# Patient Record
Sex: FEMALE | Race: WHITE | URBAN - METROPOLITAN AREA
[De-identification: names, ages, dates, MRNs, and addresses within clinical notes are randomized per-mention and may not be internally consistent; named-entity substitution may affect disease eponyms.]

---

## 2017-11-07 ENCOUNTER — EMERGENCY (EMERGENCY)
Facility: HOSPITAL | Age: 32
LOS: 1 days | Discharge: HOME | End: 2017-11-07
Attending: EMERGENCY MEDICINE | Admitting: EMERGENCY MEDICINE
Payer: COMMERCIAL

## 2017-11-07 VITALS
HEART RATE: 65 BPM | SYSTOLIC BLOOD PRESSURE: 117 MMHG | DIASTOLIC BLOOD PRESSURE: 81 MMHG | RESPIRATION RATE: 16 BRPM | OXYGEN SATURATION: 100 % | TEMPERATURE: 98 F

## 2017-11-07 VITALS
SYSTOLIC BLOOD PRESSURE: 120 MMHG | TEMPERATURE: 99 F | WEIGHT: 149.91 LBS | DIASTOLIC BLOOD PRESSURE: 78 MMHG | HEART RATE: 80 BPM | RESPIRATION RATE: 16 BRPM | OXYGEN SATURATION: 98 % | HEIGHT: 63 IN

## 2017-11-07 PROCEDURE — 72110 X-RAY EXAM L-2 SPINE 4/>VWS: CPT | Mod: 26

## 2017-11-07 PROCEDURE — 99283 EMERGENCY DEPT VISIT LOW MDM: CPT | Mod: 25

## 2017-11-07 PROCEDURE — 99284 EMERGENCY DEPT VISIT MOD MDM: CPT

## 2017-11-07 PROCEDURE — 72110 X-RAY EXAM L-2 SPINE 4/>VWS: CPT
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
## 2018-03-08 ENCOUNTER — EMERGENCY (EMERGENCY)
Facility: HOSPITAL | Age: 33
LOS: 1 days | Discharge: HOME | End: 2018-03-08
Attending: STUDENT IN AN ORGANIZED HEALTH CARE EDUCATION/TRAINING PROGRAM | Admitting: EMERGENCY MEDICINE
Payer: COMMERCIAL

## 2018-03-08 VITALS
OXYGEN SATURATION: 98 % | HEIGHT: 63 IN | DIASTOLIC BLOOD PRESSURE: 77 MMHG | RESPIRATION RATE: 16 BRPM | TEMPERATURE: 98 F | WEIGHT: 145.95 LBS | SYSTOLIC BLOOD PRESSURE: 116 MMHG | HEART RATE: 78 BPM

## 2018-03-08 PROCEDURE — 99283 EMERGENCY DEPT VISIT LOW MDM: CPT

## 2018-03-08 PROCEDURE — 99284 EMERGENCY DEPT VISIT MOD MDM: CPT

## 2018-03-08 RX ORDER — ALBUTEROL 90 UG/1
2 AEROSOL, METERED ORAL
Qty: 0 | Refills: 0 | COMMUNITY

## 2018-03-08 RX ORDER — FLUTICASONE PROPIONATE 220 MCG
2 AEROSOL WITH ADAPTER (GRAM) INHALATION
Qty: 0 | Refills: 0 | COMMUNITY

## 2018-03-08 RX ORDER — DIPHENHYDRAMINE HCL 50 MG
25 CAPSULE ORAL ONCE
Qty: 0 | Refills: 0 | Status: COMPLETED | OUTPATIENT
Start: 2018-03-08 | End: 2018-03-08

## 2018-03-08 RX ORDER — MONTELUKAST 4 MG/1
1 TABLET, CHEWABLE ORAL
Qty: 0 | Refills: 0 | COMMUNITY

## 2018-03-08 RX ORDER — FAMOTIDINE 10 MG/ML
20 INJECTION INTRAVENOUS ONCE
Qty: 0 | Refills: 0 | Status: COMPLETED | OUTPATIENT
Start: 2018-03-08 | End: 2018-03-08

## 2018-03-08 RX ADMIN — FAMOTIDINE 20 MILLIGRAM(S): 10 INJECTION INTRAVENOUS at 23:48

## 2018-03-08 RX ADMIN — Medication 20 MILLIGRAM(S): at 23:48

## 2018-03-08 RX ADMIN — Medication 25 MILLIGRAM(S): at 23:48

## 2018-03-08 NOTE — ED ADULT NURSE NOTE - OBJECTIVE STATEMENT
32 yr. old female c/o rash starting 4 am today.  Hive on left ankle and rash has spread to bilateral ankles with to upper legs. No SOB, or respiratory distress.

## 2018-03-08 NOTE — ED ADULT TRIAGE NOTE - CHIEF COMPLAINT QUOTE
" I woke this morning with rash on my left ankle. The rash started spreading after I took a shower tonight "

## 2018-03-08 NOTE — ED ADULT NURSE REASSESSMENT NOTE - NS ED NURSE REASSESS COMMENT FT1
Bilat hives to lower legs. pt states allergic to her cat. Gets hives when touches cat. states hung pants up and cat may have rubbed against the pants.

## 2018-03-09 VITALS
TEMPERATURE: 98 F | HEART RATE: 63 BPM | RESPIRATION RATE: 16 BRPM | OXYGEN SATURATION: 99 % | SYSTOLIC BLOOD PRESSURE: 113 MMHG | DIASTOLIC BLOOD PRESSURE: 75 MMHG

## 2018-03-09 NOTE — ED PROVIDER NOTE - SKIN, MLM
Skin normal color for race, warm, dry and intact. B/L ankles with redness at medial malleolus. No discrete raised papules.  Non-tender, blanching, no abscess, no LE edema

## 2018-03-09 NOTE — ED PROVIDER NOTE - OBJECTIVE STATEMENT
32 year old female with a history of asthma presents with hives to b/l ankles.  Patient states she woke up yesterday morning with left ankle itching. She thought she had an insect bite. Applied hydrocortisone ointment and was well throughout the day.  Last night, she noted right ankle and lower leg hives as well and felt that the hives on her left ankle were spreading. No respiratory distress or fevers.  No known medication or food allergies. No new detergents or contact irritants. States she gets hives when petting her cat and suspects cat hair in her jeans around her ankles.  She did not take any other medication for this and currently feels that the hives are improving. PMD Dr. Arelis Williamson

## 2018-09-03 ENCOUNTER — TRANSCRIPTION ENCOUNTER (OUTPATIENT)
Age: 33
End: 2018-09-03

## 2019-05-10 ENCOUNTER — TRANSCRIPTION ENCOUNTER (OUTPATIENT)
Age: 34
End: 2019-05-10

## 2021-06-04 NOTE — ED ADULT TRIAGE NOTE - HEART RATE (BEATS/MIN)
Post-op OV : Patient denies -> rectal bleeding, fever, nausea & vomiting since the procedure date;   Post-op OV : Patient -> denies any new changes in his/her health status since last OV.  Current BM: Has one BM every 2 days;   Post-op OV : After Colonoscopy on -> 05/21/2021, at which time one 4mm polyp was detected and resected. Histology showed it was tubular adenoma. Non- bleeding grade I internal and external hemorrhoids were found during retroflexion but not banded. Excellent bowel prep;   Follow up OV : Patient is here for a routine OV for -> constipation ;   Follow up OV : Last OV was -> 1 month ago;   Follow up OV : Patient is on -> Colace 100mg x 2 tabs QD   Has one BM every 2 days;   
80

## 2022-04-22 ENCOUNTER — EMERGENCY (EMERGENCY)
Facility: HOSPITAL | Age: 37
LOS: 0 days | Discharge: HOME | End: 2022-04-22
Attending: EMERGENCY MEDICINE
Payer: COMMERCIAL

## 2022-04-22 VITALS
SYSTOLIC BLOOD PRESSURE: 103 MMHG | TEMPERATURE: 98 F | RESPIRATION RATE: 18 BRPM | OXYGEN SATURATION: 96 % | HEART RATE: 90 BPM | DIASTOLIC BLOOD PRESSURE: 64 MMHG

## 2022-04-22 VITALS
HEART RATE: 86 BPM | OXYGEN SATURATION: 100 % | TEMPERATURE: 97 F | RESPIRATION RATE: 18 BRPM | SYSTOLIC BLOOD PRESSURE: 142 MMHG | WEIGHT: 169.98 LBS | DIASTOLIC BLOOD PRESSURE: 84 MMHG | HEIGHT: 63 IN

## 2022-04-22 DIAGNOSIS — N93.9 ABNORMAL UTERINE AND VAGINAL BLEEDING, UNSPECIFIED: ICD-10-CM

## 2022-04-22 DIAGNOSIS — K64.4 RESIDUAL HEMORRHOIDAL SKIN TAGS: ICD-10-CM

## 2022-04-22 DIAGNOSIS — Z87.59 PERSONAL HISTORY OF OTHER COMPLICATIONS OF PREGNANCY, CHILDBIRTH AND THE PUERPERIUM: ICD-10-CM

## 2022-04-22 DIAGNOSIS — J45.909 UNSPECIFIED ASTHMA, UNCOMPLICATED: ICD-10-CM

## 2022-04-22 DIAGNOSIS — R10.2 PELVIC AND PERINEAL PAIN: ICD-10-CM

## 2022-04-22 LAB
ALBUMIN SERPL ELPH-MCNC: 3.6 G/DL — SIGNIFICANT CHANGE UP (ref 3.3–5)
ALP SERPL-CCNC: 83 U/L — SIGNIFICANT CHANGE UP (ref 40–120)
ALT FLD-CCNC: 28 U/L — SIGNIFICANT CHANGE UP (ref 12–78)
ANION GAP SERPL CALC-SCNC: 3 MMOL/L — LOW (ref 5–17)
APPEARANCE UR: CLEAR — SIGNIFICANT CHANGE UP
AST SERPL-CCNC: 17 U/L — SIGNIFICANT CHANGE UP (ref 15–37)
BASOPHILS # BLD AUTO: 0.03 K/UL — SIGNIFICANT CHANGE UP (ref 0–0.2)
BASOPHILS NFR BLD AUTO: 0.5 % — SIGNIFICANT CHANGE UP (ref 0–2)
BILIRUB SERPL-MCNC: <0.1 MG/DL — LOW (ref 0.2–1.2)
BILIRUB UR-MCNC: NEGATIVE — SIGNIFICANT CHANGE UP
BUN SERPL-MCNC: 10 MG/DL — SIGNIFICANT CHANGE UP (ref 7–23)
CALCIUM SERPL-MCNC: 9.2 MG/DL — SIGNIFICANT CHANGE UP (ref 8.5–10.1)
CHLORIDE SERPL-SCNC: 113 MMOL/L — HIGH (ref 96–108)
CO2 SERPL-SCNC: 28 MMOL/L — SIGNIFICANT CHANGE UP (ref 22–31)
COLOR SPEC: YELLOW — SIGNIFICANT CHANGE UP
CREAT SERPL-MCNC: 0.69 MG/DL — SIGNIFICANT CHANGE UP (ref 0.5–1.3)
DIFF PNL FLD: ABNORMAL
EGFR: 114 ML/MIN/1.73M2 — SIGNIFICANT CHANGE UP
EOSINOPHIL # BLD AUTO: 0.22 K/UL — SIGNIFICANT CHANGE UP (ref 0–0.5)
EOSINOPHIL NFR BLD AUTO: 3.7 % — SIGNIFICANT CHANGE UP (ref 0–6)
GLUCOSE SERPL-MCNC: 116 MG/DL — HIGH (ref 70–99)
GLUCOSE UR QL: NEGATIVE — SIGNIFICANT CHANGE UP
HCG SERPL-ACNC: <1 MIU/ML — SIGNIFICANT CHANGE UP
HCT VFR BLD CALC: 38 % — SIGNIFICANT CHANGE UP (ref 34.5–45)
HGB BLD-MCNC: 11.9 G/DL — SIGNIFICANT CHANGE UP (ref 11.5–15.5)
IMM GRANULOCYTES NFR BLD AUTO: 0.2 % — SIGNIFICANT CHANGE UP (ref 0–1.5)
KETONES UR-MCNC: NEGATIVE — SIGNIFICANT CHANGE UP
LEUKOCYTE ESTERASE UR-ACNC: NEGATIVE — SIGNIFICANT CHANGE UP
LYMPHOCYTES # BLD AUTO: 2.18 K/UL — SIGNIFICANT CHANGE UP (ref 1–3.3)
LYMPHOCYTES # BLD AUTO: 37.1 % — SIGNIFICANT CHANGE UP (ref 13–44)
MCHC RBC-ENTMCNC: 27.7 PG — SIGNIFICANT CHANGE UP (ref 27–34)
MCHC RBC-ENTMCNC: 31.3 GM/DL — LOW (ref 32–36)
MCV RBC AUTO: 88.4 FL — SIGNIFICANT CHANGE UP (ref 80–100)
MONOCYTES # BLD AUTO: 0.52 K/UL — SIGNIFICANT CHANGE UP (ref 0–0.9)
MONOCYTES NFR BLD AUTO: 8.8 % — SIGNIFICANT CHANGE UP (ref 2–14)
NEUTROPHILS # BLD AUTO: 2.92 K/UL — SIGNIFICANT CHANGE UP (ref 1.8–7.4)
NEUTROPHILS NFR BLD AUTO: 49.7 % — SIGNIFICANT CHANGE UP (ref 43–77)
NITRITE UR-MCNC: NEGATIVE — SIGNIFICANT CHANGE UP
PH UR: 6 — SIGNIFICANT CHANGE UP (ref 5–8)
PLATELET # BLD AUTO: 345 K/UL — SIGNIFICANT CHANGE UP (ref 150–400)
POTASSIUM SERPL-MCNC: 4.6 MMOL/L — SIGNIFICANT CHANGE UP (ref 3.5–5.3)
POTASSIUM SERPL-SCNC: 4.6 MMOL/L — SIGNIFICANT CHANGE UP (ref 3.5–5.3)
PROT SERPL-MCNC: 7 GM/DL — SIGNIFICANT CHANGE UP (ref 6–8.3)
PROT UR-MCNC: NEGATIVE — SIGNIFICANT CHANGE UP
RBC # BLD: 4.3 M/UL — SIGNIFICANT CHANGE UP (ref 3.8–5.2)
RBC # FLD: 15.1 % — HIGH (ref 10.3–14.5)
SODIUM SERPL-SCNC: 144 MMOL/L — SIGNIFICANT CHANGE UP (ref 135–145)
SP GR SPEC: 1 — LOW (ref 1.01–1.02)
UROBILINOGEN FLD QL: NEGATIVE — SIGNIFICANT CHANGE UP
WBC # BLD: 5.88 K/UL — SIGNIFICANT CHANGE UP (ref 3.8–10.5)
WBC # FLD AUTO: 5.88 K/UL — SIGNIFICANT CHANGE UP (ref 3.8–10.5)

## 2022-04-22 PROCEDURE — 36415 COLL VENOUS BLD VENIPUNCTURE: CPT

## 2022-04-22 PROCEDURE — 86901 BLOOD TYPING SEROLOGIC RH(D): CPT

## 2022-04-22 PROCEDURE — 86900 BLOOD TYPING SEROLOGIC ABO: CPT

## 2022-04-22 PROCEDURE — 80053 COMPREHEN METABOLIC PANEL: CPT

## 2022-04-22 PROCEDURE — 99285 EMERGENCY DEPT VISIT HI MDM: CPT

## 2022-04-22 PROCEDURE — 85025 COMPLETE CBC W/AUTO DIFF WBC: CPT

## 2022-04-22 PROCEDURE — 76830 TRANSVAGINAL US NON-OB: CPT | Mod: 26

## 2022-04-22 PROCEDURE — 84702 CHORIONIC GONADOTROPIN TEST: CPT

## 2022-04-22 PROCEDURE — 76830 TRANSVAGINAL US NON-OB: CPT

## 2022-04-22 PROCEDURE — 99284 EMERGENCY DEPT VISIT MOD MDM: CPT | Mod: 25

## 2022-04-22 PROCEDURE — 86850 RBC ANTIBODY SCREEN: CPT

## 2022-04-22 PROCEDURE — 81001 URINALYSIS AUTO W/SCOPE: CPT

## 2022-04-22 RX ORDER — SODIUM CHLORIDE 9 MG/ML
1000 INJECTION INTRAMUSCULAR; INTRAVENOUS; SUBCUTANEOUS ONCE
Refills: 0 | Status: COMPLETED | OUTPATIENT
Start: 2022-04-22 | End: 2022-04-22

## 2022-04-22 RX ORDER — HYDROCORTISONE 1 %
1 OINTMENT (GRAM) TOPICAL
Qty: 1 | Refills: 0
Start: 2022-04-22 | End: 2022-04-26

## 2022-04-22 RX ADMIN — SODIUM CHLORIDE 1000 MILLILITER(S): 9 INJECTION INTRAMUSCULAR; INTRAVENOUS; SUBCUTANEOUS at 02:26

## 2022-04-22 NOTE — ED PROVIDER NOTE - OBJECTIVE STATEMENT
Pt. is a 37 yo F with a hx of hemorrhoids, hx of C section 2/3/22 without any complications presents s/p bleeding while in the shower.  Patient notes slight pelvic cramping yesterday and today and then today painless bleeding down leg in the shower.  Patient states it appeared to be coming from the vagina, but she could not tell.  She was worried because she had a light period , which was her first post partum period on 4/7/22.  She states bleeding has stopped now.  She drank 3 beers tonight.  Denies fevers, dizziness, chest pain, trouble breathing, abdominal pain or back pain.  Denies dysuria or hematuria.  +fatigue.

## 2022-04-22 NOTE — ED PROVIDER NOTE - CLINICAL SUMMARY MEDICAL DECISION MAKING FREE TEXT BOX
Bleeding source identified as external hemorrhoid.  Bleeding stopped.  Hemorrhoid cream prescribed.  Patient to f/u with PMD, GI and OB/GYN.

## 2022-04-22 NOTE — ED ADULT NURSE NOTE - NS ED NURSE RECORD ANOTHER HT AND WT
Initiate Regimen: Bryhali , apply to affected areas once daily x3 weeks. Take 1 week off, repeat as needed. Moisturize after application Start Regimen: Bryhali , apply to affected areas once daily x3 weeks. Take 1 week off, repeat as needed. Moisturize after application Yes

## 2022-04-22 NOTE — ED PROVIDER NOTE - PATIENT PORTAL LINK FT
You can access the FollowMyHealth Patient Portal offered by [facility] by registering at the following website: [email or web address]. By joining [employer]’s FollowMyHealth portal, you will also be able to view your health information using other applications (apps) compatible with our system.

## 2022-04-22 NOTE — ED ADULT NURSE NOTE - OBJECTIVE STATEMENT
37 y/o alert female comes into the ED for c/o of vaginal bleed, pt states she is not sure if its vaginal or rectal bleeding but states it is BRB x1day. denies chest pain and SOB

## 2022-04-22 NOTE — ED PROVIDER NOTE - NSFOLLOWUPINSTRUCTIONS_ED_ALL_ED_FT
See your primary doctor, gynecologist and a gastroenterologist within 1-2 weeks.   If bleeding returns or worsens or is uncontrolled return to the ER.                                                                                             Hemorrhoids       Hemorrhoids are swollen veins that may develop:  •In the butt (rectum). These are called internal hemorrhoids.      •Around the opening of the butt (anus). These are called external hemorrhoids.      Hemorrhoids can cause pain, itching, or bleeding. Most of the time, they do not cause serious problems. They usually get better with diet changes, lifestyle changes, and other home treatments.      What are the causes?    This condition may be caused by:  •Having trouble pooping (constipation).      •Pushing hard (straining) to poop.      •Watery poop (diarrhea).      •Pregnancy.       •Being very overweight (obese).      •Sitting for long periods of time.       •Heavy lifting or other activity that causes you to strain.       •Anal sex.       •Riding a bike for a long period of time.        What are the signs or symptoms?    Symptoms of this condition include:  •Pain.       •Itching or soreness in the butt.      •Bleeding from the butt.      •Leaking poop.      •Swelling in the area.      •One or more lumps around the opening of your butt.        How is this diagnosed?    A doctor can often diagnose this condition by looking at the affected area. The doctor may also:  •Do an exam that involves feeling the area with a gloved hand (digital rectal exam).      •Examine the area inside your butt using a small tube (anoscope).      •Order blood tests. This may be done if you have lost a lot of blood.      •Have you get a test that involves looking inside the colon using a flexible tube with a camera on the end (sigmoidoscopy or colonoscopy).        How is this treated?    This condition can usually be treated at home. Your doctor may tell you to change what you eat, make lifestyle changes, or try home treatments. If these do not help, procedures can be done to remove the hemorrhoids or make them smaller. These may involve:  •Placing rubber bands at the base of the hemorrhoids to cut off their blood supply.      •Injecting medicine into the hemorrhoids to shrink them.      •Shining a type of light energy onto the hemorrhoids to cause them to fall off.      •Doing surgery to remove the hemorrhoids or cut off their blood supply.        Follow these instructions at home:      Eating and drinking      •Eat foods that have a lot of fiber in them. These include whole grains, beans, nuts, fruits, and vegetables.      •Ask your doctor about taking products that have added fiber (fibersupplements).    •Reduce the amount of fat in your diet. You can do this by:  •Eating low-fat dairy products.      •Eating less red meat.      •Avoiding processed foods.        •Drink enough fluid to keep your pee (urine) pale yellow.        Managing pain and swelling      •Take a warm-water bath (sitz bath) for 20 minutes to ease pain. Do this 3–4 times a day. You may do this in a bathtub or using a portable sitz bath that fits over the toilet.    •If told, put ice on the painful area. It may be helpful to use ice between your warm baths.  •Put ice in a plastic bag.      •Place a towel between your skin and the bag.      •Leave the ice on for 20 minutes, 2–3 times a day.        General instructions   •Take over-the-counter and prescription medicines only as told by your doctor.  •Medicated creams and medicines may be used as told.        •Exercise often. Ask your doctor how much and what kind of exercise is best for you.      •Go to the bathroom when you have the urge to poop. Do not wait.      •Avoid pushing too hard when you poop.      •Keep your butt dry and clean. Use wet toilet paper or moist towelettes after pooping.      • Do not sit on the toilet for a long time.      •Keep all follow-up visits as told by your doctor. This is important.        Contact a doctor if you:    •Have pain and swelling that do not get better with treatment or medicine.      •Have trouble pooping.      •Cannot poop.      •Have pain or swelling outside the area of the hemorrhoids.        Get help right away if you have:    •Bleeding that will not stop.        Summary    •Hemorrhoids are swollen veins in the butt or around the opening of the butt.      •They can cause pain, itching, or bleeding.      •Eat foods that have a lot of fiber in them. These include whole grains, beans, nuts, fruits, and vegetables.      •Take a warm-water bath (sitz bath) for 20 minutes to ease pain. Do this 3–4 times a day.      This information is not intended to replace advice given to you by your health care provider. Make sure you discuss any questions you have with your health care provider.      Document Revised: 12/26/2019 Document Reviewed: 05/09/2019    Elsevier Patient Education © 2022 [employer]

## 2022-04-22 NOTE — ED ADULT TRIAGE NOTE - CHIEF COMPLAINT QUOTE
cc of "possible post partum hemorrhage", BRBPV x30 minutes. pt was in the shower when she noticed vaginal bleeding. pt delivered on February 3rd, cesarian, no complications. had first PP period on 4/7. denies dizziness, lightheadedness. states she had cramps yesterday but denies cramping/pain at this time. endorses fatigue. also reports she consumed 3 beers this evening.

## 2022-04-22 NOTE — ED PROVIDER NOTE - PROGRESS NOTE DETAILS
NO gross blood on vaginal wand for US and US was normal.  Patient now suspicious blood on pad which has slowed and stopped was coming from a hemorrhoid.  Rectal exam performed and small non=thrombosed external hemorrhoid visualized with adjacent small anal fissure. Will prescribe hemorrhoid cream and refer to GI.

## 2022-04-25 PROBLEM — J45.909 UNSPECIFIED ASTHMA, UNCOMPLICATED: Chronic | Status: ACTIVE | Noted: 2017-11-07
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
## 2024-08-04 ENCOUNTER — INPATIENT (INPATIENT)
Facility: HOSPITAL | Age: 39
LOS: 0 days | Discharge: HOME | DRG: 310 | End: 2024-08-05
Attending: HOSPITALIST | Admitting: INTERNAL MEDICINE
Payer: COMMERCIAL

## 2024-08-04 VITALS
HEIGHT: 63 IN | SYSTOLIC BLOOD PRESSURE: 138 MMHG | RESPIRATION RATE: 19 BRPM | TEMPERATURE: 98 F | DIASTOLIC BLOOD PRESSURE: 65 MMHG | WEIGHT: 160.06 LBS | OXYGEN SATURATION: 100 % | HEART RATE: 109 BPM

## 2024-08-04 PROCEDURE — 71046 X-RAY EXAM CHEST 2 VIEWS: CPT | Mod: 26

## 2024-08-04 PROCEDURE — 99285 EMERGENCY DEPT VISIT HI MDM: CPT

## 2024-08-04 PROCEDURE — 93010 ELECTROCARDIOGRAM REPORT: CPT

## 2024-08-04 RX ORDER — ACETAMINOPHEN 500 MG
650 TABLET ORAL ONCE
Refills: 0 | Status: COMPLETED | OUTPATIENT
Start: 2024-08-04 | End: 2024-08-04

## 2024-08-04 RX ADMIN — Medication 650 MILLIGRAM(S): at 21:57

## 2024-08-04 NOTE — ED ADULT TRIAGE NOTE - CHIEF COMPLAINT QUOTE
pt bibems from home s/p possible allergic reaction. pt reports having 5 cherries approx 45mins PTA, started having burning in mouth, which then became numbness and itching in mouth and throat. pt took zyrtec no swelling of throat or tongue, endorses feeling "tightness" in lower lip. pt in no apparent respiratory distress, denies Chest pain or SOB. no known food allergies, allergic to motrin. pmh asthma.

## 2024-08-04 NOTE — ED PROVIDER NOTE - NS_EDPROVIDERDISPOUSERTYPE_ED_A_ED
Difficulty making decisions/Difficulty remembering Attending Attestation (For Attendings USE Only)... Attending Attestation (For Attendings USE Only).../Scribe Attestation (For Scribes USE Only)...

## 2024-08-04 NOTE — ED PROVIDER NOTE - PROGRESS NOTE DETAILS
Orlando Lenz DO (Attending): Alerted by telemetry tech that patient had run of 10 beats of NSVT.  On reassessment patient states she was feeling palpitations at this time.  Has had intermittent palpitations over several days.  Patient's allergic symptoms have resolved.  Will now admit patient for EP evaluation.  Labs and formal EKG ordered.  Will admit patient to telemetry. Judd: Received in sign out pending labs work. Labs wnl except found to have hypophosphatemia. Repletion began in ED. No further episodes of NSVT on tele monitor. Patient admitted to medicine in stable condition.

## 2024-08-04 NOTE — ED PROVIDER NOTE - OBJECTIVE STATEMENT
38-year-old female past medical history of asthma presents the ED planing of sudden onset mouth burning and itchiness after eating cherries.  Patient has a history of oral allergy syndrome but is never had symptoms so severe.  Patient did believe that her lower lip was swollen and took a Zyrtec prior to arrival.  Denies throat swelling, shortness of breath, rash, tongue swelling, nausea, vomiting, abdominal pain, chest pain, dysuria, diarrhea.

## 2024-08-04 NOTE — ED PROVIDER NOTE - CLINICAL SUMMARY MEDICAL DECISION MAKING FREE TEXT BOX
Orlando Lenz DO (Attending): 38-year-old female past medical history of asthma presents the ED planing of sudden onset mouth burning and itchiness after eating cherries.  Patient has a history of oral allergy syndrome but is never had symptoms so severe.  Patient did believe that her lower lip was swollen and took a Zyrtec prior to arrival.  Denies throat swelling, shortness of breath, rash, tongue swelling, nausea, vomiting, abdominal pain, chest pain, dysuria, diarrhea. Patient hemodynamically stable, well-appearing without signs of anaphylaxis.  Patient just complaining of mild headache.  No concern for anaphylaxis and patient's symptoms of basically resolved with over-the-counter antihistamine.  Plan to observe patient for any signs of anaphylaxis and likely discharge.

## 2024-08-04 NOTE — ED ADULT NURSE NOTE - OBJECTIVE STATEMENT
36.8 pt. is a 39 y/o female a&o x3 BIBEMS complaining of allergic reaction s/p eating cherries. PMHx asthma and oral allergy syndrome as diagnosed by PCP. pt. endorsing onset of mouth burning and itchiness immediately after eating cherries.  Patient did believe that her lower lip was swollen and took a Zyrtec prior to arrival. pt. denies any drooling, shortness of breath, throat irritation, fevers, chills, or any discomfort.

## 2024-08-04 NOTE — ED ADULT NURSE REASSESSMENT NOTE - NS ED NURSE REASSESS COMMENT FT1
as per telemetry tech, pt. entered 10 seconds of SVT in bathroom, endorsing palpitations at this time. MD ott made aware, labs and EKG obtained and sent.

## 2024-08-04 NOTE — ED PROVIDER NOTE - PHYSICAL EXAMINATION
*-* INSURANCE *-*



ALL CLINICALS AND REVIEWS HAVE BEEN FAXED TO:



[employer]

TRK# [id number]

F: [phone] GEN: Patient awake alert NAD.   HEENT: normocephalic, atraumatic, Normal oropharynx without tonsillar edema, uvula midline, tongue without edema, no periorbital or perioral edema, EOMI, no scleral icterus, moist MM  CARDIAC: RRR, S1, S2, no murmur.   PULM: CTA B/L no wheeze, rhonchi, rales.   ABD: soft NT, ND, no rebound no guarding,   MSK: Moving all extremities, no edema.   NEURO: A&Ox3, gait normal, no focal neurological deficits,  SKIN: warm, dry, no rash.

## 2024-08-05 ENCOUNTER — RESULT REVIEW (OUTPATIENT)
Age: 39
End: 2024-08-05

## 2024-08-05 ENCOUNTER — TRANSCRIPTION ENCOUNTER (OUTPATIENT)
Age: 39
End: 2024-08-05

## 2024-08-05 VITALS
TEMPERATURE: 98 F | OXYGEN SATURATION: 100 % | RESPIRATION RATE: 19 BRPM | HEART RATE: 71 BPM | DIASTOLIC BLOOD PRESSURE: 62 MMHG | SYSTOLIC BLOOD PRESSURE: 112 MMHG

## 2024-08-05 DIAGNOSIS — I47.29 OTHER VENTRICULAR TACHYCARDIA: ICD-10-CM

## 2024-08-05 LAB
A1C WITH ESTIMATED AVERAGE GLUCOSE RESULT: 5.2 % — SIGNIFICANT CHANGE UP (ref 4–5.6)
ALBUMIN SERPL ELPH-MCNC: 3.1 G/DL — LOW (ref 3.3–5)
ALBUMIN SERPL ELPH-MCNC: 3.4 G/DL — SIGNIFICANT CHANGE UP (ref 3.3–5)
ALP SERPL-CCNC: 63 U/L — SIGNIFICANT CHANGE UP (ref 40–120)
ALP SERPL-CCNC: 72 U/L — SIGNIFICANT CHANGE UP (ref 40–120)
ALT FLD-CCNC: 18 U/L — SIGNIFICANT CHANGE UP (ref 12–78)
ALT FLD-CCNC: 21 U/L — SIGNIFICANT CHANGE UP (ref 12–78)
ANION GAP SERPL CALC-SCNC: 6 MMOL/L — SIGNIFICANT CHANGE UP (ref 5–17)
ANION GAP SERPL CALC-SCNC: 8 MMOL/L — SIGNIFICANT CHANGE UP (ref 5–17)
AST SERPL-CCNC: 14 U/L — LOW (ref 15–37)
AST SERPL-CCNC: 19 U/L — SIGNIFICANT CHANGE UP (ref 15–37)
BASOPHILS # BLD AUTO: 0.03 K/UL — SIGNIFICANT CHANGE UP (ref 0–0.2)
BASOPHILS # BLD AUTO: 0.03 K/UL — SIGNIFICANT CHANGE UP (ref 0–0.2)
BASOPHILS NFR BLD AUTO: 0.4 % — SIGNIFICANT CHANGE UP (ref 0–2)
BASOPHILS NFR BLD AUTO: 0.5 % — SIGNIFICANT CHANGE UP (ref 0–2)
BILIRUB SERPL-MCNC: 0.2 MG/DL — SIGNIFICANT CHANGE UP (ref 0.2–1.2)
BILIRUB SERPL-MCNC: 0.3 MG/DL — SIGNIFICANT CHANGE UP (ref 0.2–1.2)
BUN SERPL-MCNC: 7 MG/DL — SIGNIFICANT CHANGE UP (ref 7–23)
BUN SERPL-MCNC: 9 MG/DL — SIGNIFICANT CHANGE UP (ref 7–23)
CALCIUM SERPL-MCNC: 8.4 MG/DL — LOW (ref 8.5–10.1)
CALCIUM SERPL-MCNC: 8.9 MG/DL — SIGNIFICANT CHANGE UP (ref 8.5–10.1)
CHLORIDE SERPL-SCNC: 109 MMOL/L — HIGH (ref 96–108)
CHLORIDE SERPL-SCNC: 114 MMOL/L — HIGH (ref 96–108)
CHOLEST SERPL-MCNC: 147 MG/DL — SIGNIFICANT CHANGE UP
CO2 SERPL-SCNC: 23 MMOL/L — SIGNIFICANT CHANGE UP (ref 22–31)
CO2 SERPL-SCNC: 24 MMOL/L — SIGNIFICANT CHANGE UP (ref 22–31)
CREAT SERPL-MCNC: 0.6 MG/DL — SIGNIFICANT CHANGE UP (ref 0.5–1.3)
CREAT SERPL-MCNC: 0.86 MG/DL — SIGNIFICANT CHANGE UP (ref 0.5–1.3)
EGFR: 116 ML/MIN/1.73M2 — SIGNIFICANT CHANGE UP
EGFR: 88 ML/MIN/1.73M2 — SIGNIFICANT CHANGE UP
EOSINOPHIL # BLD AUTO: 0.02 K/UL — SIGNIFICANT CHANGE UP (ref 0–0.5)
EOSINOPHIL # BLD AUTO: 0.07 K/UL — SIGNIFICANT CHANGE UP (ref 0–0.5)
EOSINOPHIL NFR BLD AUTO: 0.3 % — SIGNIFICANT CHANGE UP (ref 0–6)
EOSINOPHIL NFR BLD AUTO: 1 % — SIGNIFICANT CHANGE UP (ref 0–6)
ESTIMATED AVERAGE GLUCOSE: 103 MG/DL — SIGNIFICANT CHANGE UP (ref 68–114)
GLUCOSE SERPL-MCNC: 145 MG/DL — HIGH (ref 70–99)
GLUCOSE SERPL-MCNC: 98 MG/DL — SIGNIFICANT CHANGE UP (ref 70–99)
HCG SERPL-ACNC: <1 MIU/ML — SIGNIFICANT CHANGE UP
HCT VFR BLD CALC: 38.5 % — SIGNIFICANT CHANGE UP (ref 34.5–45)
HCT VFR BLD CALC: 39 % — SIGNIFICANT CHANGE UP (ref 34.5–45)
HDLC SERPL-MCNC: 64 MG/DL — SIGNIFICANT CHANGE UP
HGB BLD-MCNC: 12.6 G/DL — SIGNIFICANT CHANGE UP (ref 11.5–15.5)
HGB BLD-MCNC: 12.7 G/DL — SIGNIFICANT CHANGE UP (ref 11.5–15.5)
IMM GRANULOCYTES NFR BLD AUTO: 0.1 % — SIGNIFICANT CHANGE UP (ref 0–0.9)
IMM GRANULOCYTES NFR BLD AUTO: 0.2 % — SIGNIFICANT CHANGE UP (ref 0–0.9)
LIPID PNL WITH DIRECT LDL SERPL: 72 MG/DL — SIGNIFICANT CHANGE UP
LYMPHOCYTES # BLD AUTO: 1.99 K/UL — SIGNIFICANT CHANGE UP (ref 1–3.3)
LYMPHOCYTES # BLD AUTO: 2.13 K/UL — SIGNIFICANT CHANGE UP (ref 1–3.3)
LYMPHOCYTES # BLD AUTO: 29.5 % — SIGNIFICANT CHANGE UP (ref 13–44)
LYMPHOCYTES # BLD AUTO: 34.6 % — SIGNIFICANT CHANGE UP (ref 13–44)
MAGNESIUM SERPL-MCNC: 2.1 MG/DL — SIGNIFICANT CHANGE UP (ref 1.6–2.6)
MCHC RBC-ENTMCNC: 29.9 PG — SIGNIFICANT CHANGE UP (ref 27–34)
MCHC RBC-ENTMCNC: 30.4 PG — SIGNIFICANT CHANGE UP (ref 27–34)
MCHC RBC-ENTMCNC: 32.3 GM/DL — SIGNIFICANT CHANGE UP (ref 32–36)
MCHC RBC-ENTMCNC: 33 GM/DL — SIGNIFICANT CHANGE UP (ref 32–36)
MCV RBC AUTO: 92.1 FL — SIGNIFICANT CHANGE UP (ref 80–100)
MCV RBC AUTO: 92.4 FL — SIGNIFICANT CHANGE UP (ref 80–100)
MONOCYTES # BLD AUTO: 0.5 K/UL — SIGNIFICANT CHANGE UP (ref 0–0.9)
MONOCYTES # BLD AUTO: 0.73 K/UL — SIGNIFICANT CHANGE UP (ref 0–0.9)
MONOCYTES NFR BLD AUTO: 10.1 % — SIGNIFICANT CHANGE UP (ref 2–14)
MONOCYTES NFR BLD AUTO: 8.7 % — SIGNIFICANT CHANGE UP (ref 2–14)
NEUTROPHILS # BLD AUTO: 3.2 K/UL — SIGNIFICANT CHANGE UP (ref 1.8–7.4)
NEUTROPHILS # BLD AUTO: 4.26 K/UL — SIGNIFICANT CHANGE UP (ref 1.8–7.4)
NEUTROPHILS NFR BLD AUTO: 55.7 % — SIGNIFICANT CHANGE UP (ref 43–77)
NEUTROPHILS NFR BLD AUTO: 58.9 % — SIGNIFICANT CHANGE UP (ref 43–77)
NON HDL CHOLESTEROL: 83 MG/DL — SIGNIFICANT CHANGE UP
NT-PROBNP SERPL-SCNC: 19 PG/ML — SIGNIFICANT CHANGE UP (ref 0–125)
PHOSPHATE SERPL-MCNC: 0.9 MG/DL — CRITICAL LOW (ref 2.5–4.5)
PHOSPHATE SERPL-MCNC: 2.7 MG/DL — SIGNIFICANT CHANGE UP (ref 2.5–4.5)
PLATELET # BLD AUTO: 271 K/UL — SIGNIFICANT CHANGE UP (ref 150–400)
PLATELET # BLD AUTO: 291 K/UL — SIGNIFICANT CHANGE UP (ref 150–400)
POTASSIUM SERPL-MCNC: 3.8 MMOL/L — SIGNIFICANT CHANGE UP (ref 3.5–5.3)
POTASSIUM SERPL-MCNC: 4 MMOL/L — SIGNIFICANT CHANGE UP (ref 3.5–5.3)
POTASSIUM SERPL-SCNC: 3.8 MMOL/L — SIGNIFICANT CHANGE UP (ref 3.5–5.3)
POTASSIUM SERPL-SCNC: 4 MMOL/L — SIGNIFICANT CHANGE UP (ref 3.5–5.3)
PROT SERPL-MCNC: 6.2 GM/DL — SIGNIFICANT CHANGE UP (ref 6–8.3)
PROT SERPL-MCNC: 6.9 GM/DL — SIGNIFICANT CHANGE UP (ref 6–8.3)
RBC # BLD: 4.18 M/UL — SIGNIFICANT CHANGE UP (ref 3.8–5.2)
RBC # BLD: 4.22 M/UL — SIGNIFICANT CHANGE UP (ref 3.8–5.2)
RBC # FLD: 13.7 % — SIGNIFICANT CHANGE UP (ref 10.3–14.5)
RBC # FLD: 13.7 % — SIGNIFICANT CHANGE UP (ref 10.3–14.5)
SODIUM SERPL-SCNC: 141 MMOL/L — SIGNIFICANT CHANGE UP (ref 135–145)
SODIUM SERPL-SCNC: 143 MMOL/L — SIGNIFICANT CHANGE UP (ref 135–145)
TRIGL SERPL-MCNC: 51 MG/DL — SIGNIFICANT CHANGE UP
TROPONIN I, HIGH SENSITIVITY RESULT: 3.93 NG/L — SIGNIFICANT CHANGE UP
TSH SERPL-MCNC: 1.51 UU/ML — SIGNIFICANT CHANGE UP (ref 0.34–4.82)
WBC # BLD: 5.75 K/UL — SIGNIFICANT CHANGE UP (ref 3.8–10.5)
WBC # BLD: 7.23 K/UL — SIGNIFICANT CHANGE UP (ref 3.8–10.5)
WBC # FLD AUTO: 5.75 K/UL — SIGNIFICANT CHANGE UP (ref 3.8–10.5)
WBC # FLD AUTO: 7.23 K/UL — SIGNIFICANT CHANGE UP (ref 3.8–10.5)

## 2024-08-05 PROCEDURE — 84100 ASSAY OF PHOSPHORUS: CPT

## 2024-08-05 PROCEDURE — 93005 ELECTROCARDIOGRAM TRACING: CPT

## 2024-08-05 PROCEDURE — 83036 HEMOGLOBIN GLYCOSYLATED A1C: CPT

## 2024-08-05 PROCEDURE — 99222 1ST HOSP IP/OBS MODERATE 55: CPT

## 2024-08-05 PROCEDURE — 80053 COMPREHEN METABOLIC PANEL: CPT

## 2024-08-05 PROCEDURE — 80061 LIPID PANEL: CPT

## 2024-08-05 PROCEDURE — 93306 TTE W/DOPPLER COMPLETE: CPT | Mod: 26

## 2024-08-05 PROCEDURE — 76376 3D RENDER W/INTRP POSTPROCES: CPT | Mod: 26

## 2024-08-05 PROCEDURE — 36415 COLL VENOUS BLD VENIPUNCTURE: CPT

## 2024-08-05 PROCEDURE — 93010 ELECTROCARDIOGRAM REPORT: CPT

## 2024-08-05 PROCEDURE — 85025 COMPLETE CBC W/AUTO DIFF WBC: CPT

## 2024-08-05 PROCEDURE — 99223 1ST HOSP IP/OBS HIGH 75: CPT

## 2024-08-05 PROCEDURE — 84443 ASSAY THYROID STIM HORMONE: CPT

## 2024-08-05 RX ORDER — ALPRAZOLAM 0.5 MG
0.25 TABLET ORAL ONCE
Refills: 0 | Status: DISCONTINUED | OUTPATIENT
Start: 2024-08-05 | End: 2024-08-05

## 2024-08-05 RX ORDER — DIPHENHYDRAMINE HCL 25 MG
25 CAPSULE ORAL
Refills: 0 | Status: DISCONTINUED | OUTPATIENT
Start: 2024-08-05 | End: 2024-08-05

## 2024-08-05 RX ORDER — POTASSIUM CHLORIDE 1500 MG/1
20 TABLET, EXTENDED RELEASE ORAL ONCE
Refills: 0 | Status: DISCONTINUED | OUTPATIENT
Start: 2024-08-05 | End: 2024-08-05

## 2024-08-05 RX ORDER — POTASSIUM PHOSPHATE, MONOBASIC AND POTASSIUM PHOSPHATE, DIBASIC 224; 236 MG/ML; MG/ML
15 INJECTION, SOLUTION INTRAVENOUS ONCE
Refills: 0 | Status: COMPLETED | OUTPATIENT
Start: 2024-08-05 | End: 2024-08-05

## 2024-08-05 RX ORDER — SODIUM PHOSPHATE, MONOBASIC, MONOHYDRATE 276; 142 MG/ML; MG/ML
30 INJECTION, SOLUTION INTRAVENOUS ONCE
Refills: 0 | Status: DISCONTINUED | OUTPATIENT
Start: 2024-08-05 | End: 2024-08-05

## 2024-08-05 RX ORDER — ALBUTEROL 90 MCG
2 AEROSOL REFILL (GRAM) INHALATION EVERY 6 HOURS
Refills: 0 | Status: DISCONTINUED | OUTPATIENT
Start: 2024-08-05 | End: 2024-08-05

## 2024-08-05 RX ADMIN — POTASSIUM PHOSPHATE, MONOBASIC AND POTASSIUM PHOSPHATE, DIBASIC 62.5 MILLIMOLE(S): 224; 236 INJECTION, SOLUTION INTRAVENOUS at 02:35

## 2024-08-05 NOTE — DISCHARGE NOTE PROVIDER - CARE PROVIDER_API CALL
Mina Briceño  Cardiovascular Disease  [address], [facility] [address]  [place] [address]  Phone: [phone]  Fax: [phone]  Follow Up Time: 1 week

## 2024-08-05 NOTE — DISCHARGE NOTE NURSING/CASE MANAGEMENT/SOCIAL WORK - NSDCPEFALRISK_GEN_ALL_CORE
Call patient  She is overdue for her 6 month visit  With I had blood work orders in her chart for April  Please schedule an appointment before she needs another refill  For information on Fall & Injury Prevention, visit: [email or web address] OR  [email or web address] OR  https://www.cdc.gov/steadi/patient.html

## 2024-08-05 NOTE — H&P ADULT - ASSESSMENT
39 y/o F w/ PMH of asthma, oral allergy syndrome, p/w allergic reaction    *NSVT  -Tele monitoring  -Monitor electrolytes  -Echo  -EKG: NSR 96 bpm   -TSH   -Cardio consult     *Hypophosphosphatemia  -Replete w/ IV phos and recheck phos     *Allergic reaction  -Now improved  -Benadryl PRN     *DVT ppx  -SCDs     >75 mins required for admission

## 2024-08-05 NOTE — CONSULT NOTE ADULT - SUBJECTIVE AND OBJECTIVE BOX
Patient is a 38y old  Female who presents with a chief complaint of allergic reaction (05 Aug 2024 03:25)      HPI:  37 y/o F w/ PMH of asthma, oral allergy syndrome, p/w allergic reaction. Patient states that she initially came to ED due mouth burning / itchiness / swelling feeling after eating cherries. Symptoms have symptoms improved. ED physician was going to discharge patient, but then she developed NSVT on Tele, 10 beat run. Patient states that she has been having intermittent palpitations over the last couple of months, where her heart occasionally feels like fluttering. Denies CP, SOB, hoarse voice.   Reports no recent  chest pain or fainting episodes.      PSH:     Social Hx: Denies tobacco, etoh - occasional, drugs - denies     Family Hx: mother - ablation for SVT   (05 Aug 2024 03:25)      PAST MEDICAL & SURGICAL HISTORY:  Asthma      No significant past surgical history            Allergies    Cherries (Angioedema)  Levaquin (Other)    Intolerances        MEDICATIONS  (STANDING):    MEDICATIONS  (PRN):  albuterol    90 MICROgram(s) HFA Inhaler 2 Puff(s) Inhalation every 6 hours PRN Bronchospasm  ALPRAZolam 0.25 milliGRAM(s) Oral Once PRN Anxiety  diphenhydrAMINE 25 milliGRAM(s) Oral two times a day PRN Allergy symptoms      FAMILY HISTORY:  Father, AF  Mother, SVT    SOCIAL HISTORY  Tobacco use:  denies  Alcohol consumption:  denies      REVIEW OF SYSTEMS:  CONSTITUTIONAL: No fever, weight loss, or fatigue  EYES: No eye pain, visual disturbances, or discharge  ENMT:  No difficulty hearing, tinnitus, vertigo; No sinus or throat pain  NECK: No pain or stiffness  RESPIRATORY: No cough, wheezing, chills or hemoptysis; No Shortness of Breath  CARDIOVASCULAR: HPI  GASTROINTESTINAL: No abdominal or epigastric pain. No nausea, vomiting, or hematemesis; No diarrhea or constipation. No melena or hematochezia.  GENITOURINARY: No dysuria, frequency, hematuria, or incontinence  NEUROLOGICAL: No headaches, memory loss, loss of strength, numbness, or tremors  SKIN: No itching, burning, rashes, or lesions   LYMPH Nodes: No enlarged lymph nodes  ENDOCRINE: No heat or cold intolerance; No hair loss  MUSCULOSKELETAL: No joint pain or swelling; No muscle, back, or extremity pain  PSYCHIATRIC: No depression, anxiety, mood swings, or difficulty sleeping  HEME/LYMPH: No easy bruising, or bleeding gums      Vital Signs Last 24 Hrs  T(C): 36.9 (05 Aug 2024 09:09), Max: 36.9 (04 Aug 2024 21:29)  T(F): 98.5 (05 Aug 2024 09:09), Max: 98.5 (05 Aug 2024 09:09)  HR: 71 (05 Aug 2024 09:09) (69 - 109)  BP: 112/62 (05 Aug 2024 09:09) (112/62 - 138/65)  BP(mean): --  RR: 19 (05 Aug 2024 09:09) (18 - 19)  SpO2: 100% (05 Aug 2024 09:09) (99% - 100%)    Parameters below as of 05 Aug 2024 09:09  Patient On (Oxygen Delivery Method): room air        Daily Height in cm: 160.02 (04 Aug 2024 21:29)    Daily Weight in k.8 (05 Aug 2024 03:21)    I&O's Detail      PHYSICAL EXAM:  Appearance: No distress  HEENT:   Normal oral mucosa  Cardiovascular: Normal S1 S2, No JVD, No cardiac murmurs, No carotid bruits, No peripheral edema  Respiratory: Lungs clear to auscultation	  Psychiatry: A & O x 3, Mood & affect appropriate  Gastrointestinal:  Soft, Non-tender, + BS, no bruits	  Skin: No rashes, No ecchymoses, No cyanosis  Neurologic: Grossly non-focal with full strength in all four extremities  Extremities: Normal range of motion, No clubbing, cyanosis or edema  Vascular: Peripheral pulses palpable 2+ bilaterally      INTERPRETATION OF TELEMETRY:    ECG:    LABS:                        12.6   5.75  )-----------( 271      ( 05 Aug 2024 06:33 )             39.0     08-05    143  |  114<H>  |  7   ----------------------------<  98  4.0   |  23  |  0.60    Ca    8.4<L>      05 Aug 2024 06:33  Phos  0.9     08-04  Mg     2.1     08-04    TPro  6.2  /  Alb  3.1<L>  /  TBili  0.3  /  DBili  x   /  AST  14<L>  /  ALT  18  /  AlkPhos  63        Thyroid Stimulating Hormone, Serum: 1.51 uU/mL (24 @ 06:33)    Troponin I, High Sensitivity Result: 3.93 ng/L (24 @ 23:07)  Pro-Brain Natriuretic Peptide: 19 pg/mL (24 @ 23:07)        Admitting attending: Norberto Perez  Outpatient provider:      Patient is a 38y old  Female who presents with a chief complaint of allergic reaction (05 Aug 2024 03:25)      HPI:  37 y/o F w/ PMH of asthma, oral allergy syndrome, p/w allergic reaction. Patient states that she initially came to ED due mouth burning / itchiness / swelling feeling after eating cherries. Symptoms have symptoms improved. ED physician was going to discharge patient, but then she developed NSVT on Tele, 10 beat run. Patient states that she has been having intermittent palpitations over the last couple of months, where her heart occasionally feels like fluttering. Denies CP, SOB, hoarse voice.   Reports no recent  chest pain or fainting episodes.      PSH:     Social Hx: Denies tobacco, etoh - occasional, drugs - denies     Family Hx: mother - ablation for SVT   (05 Aug 2024 03:25)      PAST MEDICAL & SURGICAL HISTORY:  Asthma      No significant past surgical history            Allergies    Cherries (Angioedema)  Levaquin (Other)    Intolerances        MEDICATIONS  (STANDING):    MEDICATIONS  (PRN):  albuterol    90 MICROgram(s) HFA Inhaler 2 Puff(s) Inhalation every 6 hours PRN Bronchospasm  ALPRAZolam 0.25 milliGRAM(s) Oral Once PRN Anxiety  diphenhydrAMINE 25 milliGRAM(s) Oral two times a day PRN Allergy symptoms      FAMILY HISTORY:  Father, AF  Mother, SVT    SOCIAL HISTORY  Tobacco use:  denies  Alcohol consumption:  denies      REVIEW OF SYSTEMS:  CONSTITUTIONAL: No fever, weight loss, or fatigue  EYES: No eye pain, visual disturbances, or discharge  ENMT:  No difficulty hearing, tinnitus, vertigo; No sinus or throat pain  NECK: No pain or stiffness  RESPIRATORY: No cough, wheezing, chills or hemoptysis; No Shortness of Breath  CARDIOVASCULAR: HPI  GASTROINTESTINAL: No abdominal or epigastric pain. No nausea, vomiting, or hematemesis; No diarrhea or constipation. No melena or hematochezia.  GENITOURINARY: No dysuria, frequency, hematuria, or incontinence  NEUROLOGICAL: No headaches, memory loss, loss of strength, numbness, or tremors  SKIN: No itching, burning, rashes, or lesions   LYMPH Nodes: No enlarged lymph nodes  ENDOCRINE: No heat or cold intolerance; No hair loss  MUSCULOSKELETAL: No joint pain or swelling; No muscle, back, or extremity pain  PSYCHIATRIC: No depression, anxiety, mood swings, or difficulty sleeping  HEME/LYMPH: No easy bruising, or bleeding gums      Vital Signs Last 24 Hrs  T(C): 36.9 (05 Aug 2024 09:09), Max: 36.9 (04 Aug 2024 21:29)  T(F): 98.5 (05 Aug 2024 09:09), Max: 98.5 (05 Aug 2024 09:09)  HR: 71 (05 Aug 2024 09:09) (69 - 109)  BP: 112/62 (05 Aug 2024 09:09) (112/62 - 138/65)  BP(mean): --  RR: 19 (05 Aug 2024 09:09) (18 - 19)  SpO2: 100% (05 Aug 2024 09:09) (99% - 100%)    Parameters below as of 05 Aug 2024 09:09  Patient On (Oxygen Delivery Method): room air        Daily Height in cm: 160.02 (04 Aug 2024 21:29)    Daily Weight in k.8 (05 Aug 2024 03:21)    I&O's Detail      PHYSICAL EXAM:  Appearance: No distress  HEENT:   Normal oral mucosa  Cardiovascular: Normal S1 S2, No JVD, No cardiac murmurs, No carotid bruits, No peripheral edema  Respiratory: Lungs clear to auscultation	  Psychiatry: A & O x 3, Mood & affect appropriate  Gastrointestinal:  Soft, Non-tender, + BS, no bruits	  Skin: No rashes, No ecchymoses, No cyanosis  Neurologic: Grossly non-focal with full strength in all four extremities  Extremities: Normal range of motion, No clubbing, cyanosis or edema  Vascular: Peripheral pulses palpable 2+ bilaterally      INTERPRETATION OF TELEMETRY: SR, 10-beat NSVT    ECG: SR    LABS:                        12.6   5.75  )-----------( 271      ( 05 Aug 2024 06:33 )             39.0     08-05    143  |  114<H>  |  7   ----------------------------<  98  4.0   |  23  |  0.60    Ca    8.4<L>      05 Aug 2024 06:33  Phos  0.9     08-04  Mg     2.1     08-04    TPro  6.2  /  Alb  3.1<L>  /  TBili  0.3  /  DBili  x   /  AST  14<L>  /  ALT  18  /  AlkPhos  63        Thyroid Stimulating Hormone, Serum: 1.51 uU/mL (24 @ 06:33)    Troponin I, High Sensitivity Result: 3.93 ng/L (24 @ 23:07)  Pro-Brain Natriuretic Peptide: 19 pg/mL (24 @ 23:07)        Admitting attending: Norberto Perez  Outpatient provider:

## 2024-08-05 NOTE — PROGRESS NOTE ADULT - SUBJECTIVE AND OBJECTIVE BOX
Results reviewed. Normal phos, troponins, TTE.   OK to DC home from a cardiac perspective. Follow up in 2 weeks.

## 2024-08-05 NOTE — PATIENT PROFILE ADULT - MONEY FOR FOOD
no Render Note In Bullet Format When Appropriate: No Detail Level: Detailed Consent: The patient's consent was obtained including but not limited to risks of crusting, scabbing, blistering, scarring, darker or lighter pigmentary change, recurrence, incomplete removal and infection. Post-Care Instructions: I reviewed with the patient in detail post-care instructions. Patient is to wear sunprotection, and avoid picking at any of the treated lesions. Pt may apply Vaseline to crusted or scabbing areas. Duration Of Freeze Thaw-Cycle (Seconds): 1 Number Of Freeze-Thaw Cycles: 2 freeze-thaw cycles

## 2024-08-05 NOTE — DISCHARGE NOTE PROVIDER - NSDCCPCAREPLAN_GEN_ALL_CORE_FT
PRINCIPAL DISCHARGE DIAGNOSIS  Diagnosis: NSVT (nonsustained ventricular tachycardia)  Assessment and Plan of Treatment:

## 2024-08-05 NOTE — DISCHARGE NOTE PROVIDER - HOSPITAL COURSE
37 y/o F w/ PMH of asthma, oral allergy syndrome, p/w allergic reaction. Patient states that she initially came to ED due mouth burning / itchiness / swelling feeling after eating cherries. Symptoms have symptoms improved. ED physician was going to discharge patient, but then she developed NSVT on Tele for ~3 seconds. Patient states that she has been having intermittent palpitations over the last couple of months, where her heart occasionally feels like fluttering. Denies CP, SOB, hoarse voice.        #Admitted in tele unit for NSVT, did not reveal any acute pathology, seen and evaluated by cardiology team and cleared by for discharge    Vital Signs Last 24 Hrs  T(C): 36.9 (05 Aug 2024 09:09), Max: 36.9 (04 Aug 2024 21:29)  T(F): 98.5 (05 Aug 2024 09:09), Max: 98.5 (05 Aug 2024 09:09)  HR: 71 (05 Aug 2024 09:09) (69 - 109)  BP: 112/62 (05 Aug 2024 09:09) (112/62 - 138/65)  BP(mean): --  RR: 19 (05 Aug 2024 09:09) (18 - 19)  SpO2: 100% (05 Aug 2024 09:09) (99% - 100%)    Parameters below as of 05 Aug 2024 09:09  Patient On (Oxygen Delivery Method): room air        General: WN/WD NAD  Head- Atraumatic, normocephalic   Neurology: A&Ox3, nonfocal, CN II to XII intact, power intact 5/5 in all muscle group  HEENT- PERRLA, moist muscous membrane  Neck-supple, no JVD  Respiratory: Air entry equal b/l, CTA   CVS:  S1S2, no murmurs, rubs or gallops  Abdominal: Soft, NT, ND +BS,   Genitourinary- voiding, non palpable bladder  Extremities: No edema, + peripheral pulses  Skin- no rash, no ulcer  Psychiatric- mood stable   LN- no lymphadenopathy         08-05-24 @ 14:05          Pt has been managed here symptomatically, currently hemodynamically stable and is being discharged with further management as an outpt, time spent 45 minutes

## 2024-08-05 NOTE — CONSULT NOTE ADULT - PROBLEM SELECTOR RECOMMENDATION 9
Single episode at rest.   TTE pending. Phos low but sample sat bedside for 2 hrs. being repeated.   Troponin levels pending.  TSH normal.   No sign of ACS.

## 2024-08-05 NOTE — H&P ADULT - HISTORY OF PRESENT ILLNESS
39 y/o F w/ PMH of asthma, oral allergy syndrome, p/w allergic reaction. Patient states that she initially came to ED due mouth burning / itchiness / swelling feeling after eating cherries. Symptoms have symptoms improved. ED physician was going to discharge patient, but then she developed NSVT on Tele for ~3 seconds. Patient states that she has been having intermittent palpitations over the last couple of months, where her heart occasionally feels like fluttering. Denies CP, SOB, hoarse voice.        PSH:     Social Hx: Denies tobacco, etoh - occasional, drugs - denies     Family Hx: mother - ablation for SVT

## 2024-08-07 ENCOUNTER — TRANSCRIPTION ENCOUNTER (OUTPATIENT)
Age: 39
End: 2024-08-07

## 2024-08-09 PROBLEM — Z86.59 HISTORY OF ANXIETY: Status: RESOLVED | Noted: 2024-08-09 | Resolved: 2024-08-09

## 2024-08-09 PROBLEM — Z83.49 FAMILY HISTORY OF HASHIMOTO THYROIDITIS: Status: ACTIVE | Noted: 2024-08-09

## 2024-08-09 PROBLEM — Z87.09 HISTORY OF ASTHMA: Status: RESOLVED | Noted: 2024-08-09 | Resolved: 2024-08-09

## 2024-08-09 PROBLEM — Z82.49 FAMILY HISTORY OF ATRIAL FIBRILLATION: Status: ACTIVE | Noted: 2024-08-09

## 2024-08-09 PROBLEM — Z82.49 FAMILY HISTORY OF PAROXYSMAL SUPRAVENTRICULAR TACHYCARDIA: Status: ACTIVE | Noted: 2024-08-09

## 2024-08-09 PROBLEM — Z82.49 FAMILY HISTORY OF HYPERTENSION: Status: ACTIVE | Noted: 2024-08-09

## 2024-08-13 ENCOUNTER — APPOINTMENT (OUTPATIENT)
Dept: CARDIOLOGY | Facility: CLINIC | Age: 39
End: 2024-08-13
Payer: COMMERCIAL

## 2024-08-13 VITALS
BODY MASS INDEX: 27.46 KG/M2 | SYSTOLIC BLOOD PRESSURE: 112 MMHG | HEIGHT: 63 IN | HEART RATE: 83 BPM | WEIGHT: 155 LBS | DIASTOLIC BLOOD PRESSURE: 78 MMHG | OXYGEN SATURATION: 99 %

## 2024-08-13 DIAGNOSIS — I47.29 OTHER VENTRICULAR TACHYCARDIA: ICD-10-CM

## 2024-08-13 PROCEDURE — 99214 OFFICE O/P EST MOD 30 MIN: CPT | Mod: 25

## 2024-08-13 PROCEDURE — 93000 ELECTROCARDIOGRAM COMPLETE: CPT

## 2024-08-14 DIAGNOSIS — I47.20 VENTRICULAR TACHYCARDIA, UNSPECIFIED: ICD-10-CM

## 2024-08-14 DIAGNOSIS — T78.1XXA OTHER ADVERSE FOOD REACTIONS, NOT ELSEWHERE CLASSIFIED, INITIAL ENCOUNTER: ICD-10-CM

## 2024-08-14 DIAGNOSIS — E83.39 OTHER DISORDERS OF PHOSPHORUS METABOLISM: ICD-10-CM

## 2024-08-14 DIAGNOSIS — Z88.1 ALLERGY STATUS TO OTHER ANTIBIOTIC AGENTS: ICD-10-CM
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
## 2024-08-14 DIAGNOSIS — J45.909 UNSPECIFIED ASTHMA, UNCOMPLICATED: ICD-10-CM

## 2024-09-04 ENCOUNTER — APPOINTMENT (OUTPATIENT)
Dept: CARDIOLOGY | Facility: CLINIC | Age: 39
End: 2024-09-04
Payer: COMMERCIAL

## 2024-09-04 VITALS
HEIGHT: 63 IN | HEART RATE: 73 BPM | WEIGHT: 154 LBS | BODY MASS INDEX: 27.29 KG/M2 | SYSTOLIC BLOOD PRESSURE: 132 MMHG | DIASTOLIC BLOOD PRESSURE: 79 MMHG | OXYGEN SATURATION: 100 % | RESPIRATION RATE: 14 BRPM

## 2024-09-04 DIAGNOSIS — I47.29 OTHER VENTRICULAR TACHYCARDIA: ICD-10-CM

## 2024-09-04 DIAGNOSIS — R07.9 CHEST PAIN, UNSPECIFIED: ICD-10-CM

## 2024-09-04 DIAGNOSIS — I49.1 ATRIAL PREMATURE DEPOLARIZATION: ICD-10-CM

## 2024-09-04 PROCEDURE — 99213 OFFICE O/P EST LOW 20 MIN: CPT

## 2024-09-04 RX ORDER — METOPROLOL SUCCINATE 25 MG/1
25 TABLET, EXTENDED RELEASE ORAL DAILY
Qty: 30 | Refills: 5 | Status: ACTIVE | COMMUNITY
Start: 2024-09-04 | End: 1900-01-01

## 2024-09-04 NOTE — PHYSICAL EXAM
[Normal S1, S2] : normal S1, S2 [No Murmur] : no murmur [Clear Lung Fields] : clear lung fields [Good Air Entry] : good air entry [Normal Gait] : normal gait

## 2024-09-04 NOTE — REVIEW OF SYSTEMS
[SOB] : no shortness of breath [Dyspnea on exertion] : not dyspnea during exertion [Cough] : no cough [Wheezing] : no wheezing

## 2024-09-04 NOTE — HISTORY OF PRESENT ILLNESS
[FreeTextEntry1] : Experienced palpitations while wearing ambulatory cardiac monitor. Feels occasional chest discomfort at rest.

## 2024-10-14 ENCOUNTER — APPOINTMENT (OUTPATIENT)
Dept: CARDIOLOGY | Facility: CLINIC | Age: 39
End: 2024-10-14

## 2024-10-28 ENCOUNTER — APPOINTMENT (OUTPATIENT)
Dept: ELECTROPHYSIOLOGY | Facility: CLINIC | Age: 39
End: 2024-10-28